# Patient Record
Sex: FEMALE | Race: WHITE | NOT HISPANIC OR LATINO
[De-identification: names, ages, dates, MRNs, and addresses within clinical notes are randomized per-mention and may not be internally consistent; named-entity substitution may affect disease eponyms.]

---

## 2018-10-15 ENCOUNTER — APPOINTMENT (OUTPATIENT)
Dept: SURGERY | Facility: CLINIC | Age: 58
End: 2018-10-15
Payer: COMMERCIAL

## 2018-10-15 ENCOUNTER — APPOINTMENT (OUTPATIENT)
Dept: SURGERY | Facility: CLINIC | Age: 58
End: 2018-10-15

## 2018-10-15 VITALS
TEMPERATURE: 97.9 F | HEART RATE: 89 BPM | WEIGHT: 232 LBS | DIASTOLIC BLOOD PRESSURE: 85 MMHG | BODY MASS INDEX: 38.65 KG/M2 | HEIGHT: 65 IN | SYSTOLIC BLOOD PRESSURE: 132 MMHG | OXYGEN SATURATION: 97 %

## 2018-10-15 PROCEDURE — 99204 OFFICE O/P NEW MOD 45 MIN: CPT

## 2018-10-16 ENCOUNTER — APPOINTMENT (OUTPATIENT)
Dept: BARIATRICS | Facility: CLINIC | Age: 58
End: 2018-10-16

## 2018-10-25 ENCOUNTER — APPOINTMENT (OUTPATIENT)
Dept: ENDOCRINOLOGY | Facility: CLINIC | Age: 58
End: 2018-10-25

## 2018-10-26 ENCOUNTER — APPOINTMENT (OUTPATIENT)
Dept: UROLOGY | Facility: CLINIC | Age: 58
End: 2018-10-26
Payer: COMMERCIAL

## 2018-10-26 VITALS
DIASTOLIC BLOOD PRESSURE: 79 MMHG | SYSTOLIC BLOOD PRESSURE: 148 MMHG | RESPIRATION RATE: 16 BRPM | BODY MASS INDEX: 39.15 KG/M2 | WEIGHT: 235 LBS | HEIGHT: 65 IN | HEART RATE: 92 BPM | TEMPERATURE: 98.7 F

## 2018-10-26 PROCEDURE — 99204 OFFICE O/P NEW MOD 45 MIN: CPT

## 2018-10-29 LAB
APPEARANCE: ABNORMAL
BACTERIA UR CULT: NORMAL
BACTERIA: ABNORMAL
BILIRUBIN URINE: NEGATIVE
BLOOD URINE: ABNORMAL
COLOR: YELLOW
GLUCOSE QUALITATIVE U: 100 MG/DL
HYALINE CASTS: 2 /LPF
KETONES URINE: NEGATIVE
LEUKOCYTE ESTERASE URINE: ABNORMAL
MICROSCOPIC-UA: NORMAL
NITRITE URINE: NEGATIVE
PH URINE: 7
PROTEIN URINE: ABNORMAL MG/DL
RED BLOOD CELLS URINE: 1 /HPF
SPECIFIC GRAVITY URINE: 1.01
SQUAMOUS EPITHELIAL CELLS: 10 /HPF
UROBILINOGEN URINE: NEGATIVE MG/DL
WHITE BLOOD CELLS URINE: 503 /HPF

## 2020-08-03 ENCOUNTER — APPOINTMENT (OUTPATIENT)
Dept: SURGERY | Facility: CLINIC | Age: 60
End: 2020-08-03
Payer: COMMERCIAL

## 2020-08-03 VITALS
WEIGHT: 254 LBS | SYSTOLIC BLOOD PRESSURE: 150 MMHG | BODY MASS INDEX: 42.32 KG/M2 | HEART RATE: 100 BPM | TEMPERATURE: 98 F | HEIGHT: 65 IN | OXYGEN SATURATION: 94 % | DIASTOLIC BLOOD PRESSURE: 90 MMHG

## 2020-08-03 DIAGNOSIS — F12.90 CANNABIS USE, UNSPECIFIED, UNCOMPLICATED: ICD-10-CM

## 2020-08-03 DIAGNOSIS — Z87.09 PERSONAL HISTORY OF OTHER DISEASES OF THE RESPIRATORY SYSTEM: ICD-10-CM

## 2020-08-03 DIAGNOSIS — Z82.49 FAMILY HISTORY OF ISCHEMIC HEART DISEASE AND OTHER DISEASES OF THE CIRCULATORY SYSTEM: ICD-10-CM

## 2020-08-03 DIAGNOSIS — Z86.79 PERSONAL HISTORY OF OTHER DISEASES OF THE CIRCULATORY SYSTEM: ICD-10-CM

## 2020-08-03 DIAGNOSIS — Z87.891 PERSONAL HISTORY OF NICOTINE DEPENDENCE: ICD-10-CM

## 2020-08-03 DIAGNOSIS — Z83.1 FAMILY HISTORY OF OTHER INFECTIOUS AND PARASITIC DISEASES: ICD-10-CM

## 2020-08-03 DIAGNOSIS — Z83.3 FAMILY HISTORY OF DIABETES MELLITUS: ICD-10-CM

## 2020-08-03 DIAGNOSIS — Z81.1 FAMILY HISTORY OF ALCOHOL ABUSE AND DEPENDENCE: ICD-10-CM

## 2020-08-03 DIAGNOSIS — Z86.39 PERSONAL HISTORY OF OTHER ENDOCRINE, NUTRITIONAL AND METABOLIC DISEASE: ICD-10-CM

## 2020-08-03 DIAGNOSIS — K80.10 CALCULUS OF GALLBLADDER WITH CHRONIC CHOLECYSTITIS W/OUT OBSTRUCTION: ICD-10-CM

## 2020-08-03 PROCEDURE — 99213 OFFICE O/P EST LOW 20 MIN: CPT

## 2020-08-03 NOTE — HISTORY OF PRESENT ILLNESS
[de-identified] : Ms. Styles presented previously for evaluation and management of her known cholelithiasis.  She complained of intermittent left upper quadrant abdominal pain.  The pain has been present for approximately 8 months.  She denied radiation of the pain.  She noted a "lump" in the left upper quadrant associated with the pain.  Having a bowel movement and taking Tylenol improve the pain.  She denied nausea, vomiting, fever, or chills.  She denied any food associated with the pain. [de-identified] : She presented today for follow up and to discuss cholecystectomy.  She began having right upper quadrant pain in November 2019 intermittently, mostly at night.  She rated the pain a 4/10.  She denied radiation of the pain and described the pain as stabbing in nature.

## 2020-08-03 NOTE — CONSULT LETTER
[FreeTextEntry1] : August 3, 2020\par \par \par \par Dona Nath M.D.\par [facility]\par [address] [place] [address]\[place] [address]\par Telephone #:  [phone]\par \par \par Re: Tammie Styles\par : 1960\par \par \par Dear Dr. Nath:\par \par I had the opportunity to see Ms. Styles today for follow up and to discuss cholecystectomy.  She began having right upper quadrant pain in 2019 intermittently, mostly at night.  She rated the pain a 4/10.  She denied radiation of the pain and described the pain as stabbing in nature.\par \par On physical examination, her height is 5 feet 5 inches, her weight is 254 pounds, and BMI 42.27.  Her temperature is 98 °F, blood pressure is 150/90, heart rate is 100, and O2 saturation is 94% on room air.  In general, she is a well-dressed, well-nourished woman who appears her stated age and is in no acute distress.  She is calm, alert and oriented x3.  HEENT exam demonstrates no scleral icterus and a normocephalic atraumatic appearance.  Her neck is supple without JVD or cervical lymphadenopathy.  Her lungs are clear to auscultation bilaterally.  Heart sounds S1 S2 are normal with a regular rate and rhythm.  Her abdomen has audible bowel sounds, and is soft, non-tender, and non-distended.  There is no hepatosplenomegaly.  Her extremities are warm and dry without clubbing, cyanosis, or edema. \par \par I reviewed the right upper quadrant ultrasound that was performed on 2018, which demonstrated a small, hyperechoic appearance of the left kidney suggestive of medical renal disease.  Right kidney demonstrates compensatory hypertrophy.  Cholelithiasis without evidence of acute cholecystitis.  Common bile duct measured 0.4 cm.  Hepatic steatosis with hepatomegaly.  Small benign-appearing left renal cyst measuring 0.9 cm.\par \par In summary, Ms. Styles is a 60-year-old woman with cholelithiasis.  We will plan for a robotic-assisted cholecystectomy on 2020.\par \par Thank you for the opportunity to care for this patient. Please do not hesitate to contact me in the event that you have any questions or concerns about the care of this patient.\par \par Sincerely,\par \par \par \par Mar Rodriguez M.D.

## 2020-08-03 NOTE — PHYSICAL EXAM
[Calm] : calm [de-identified] : NAD, comfortable [de-identified] : NCAT, no scleral icterus [de-identified] : Supple, no JVD or cervical lymphadenopathy [de-identified] : CTAB [de-identified] : S1S2 normal, RRR [de-identified] : No clubbing, cyanosis, or edema. [de-identified] : +BS soft, obese, minimal RUQ tenderness.  No hepatosplenomegaly. [de-identified] : Warm, dry. [de-identified] : A&Ox3

## 2020-08-03 NOTE — REVIEW OF SYSTEMS
[Feeling Tired] : feeling tired [Recent Weight Gain (___ Lbs)] : recent [unfilled] ~Ulb weight gain [Eyesight Problems] : eyesight problems [Abdominal Pain] : abdominal pain [Heartburn] : heartburn [Negative] : Heme/Lymph [Fever] : no fever [Chills] : no chills [Recent Weight Loss (___ Lbs)] : no recent weight loss [Feeling Poorly] : not feeling poorly [Red Eyes] : eyes not red [Eye Pain] : no eye pain [Discharge From Eyes] : no purulent discharge from the eyes [Dry Eyes] : no dryness of the eyes [Eyes Itch] : no itching of the eyes [Constipation] : no constipation [Vomiting] : no vomiting [Diarrhea] : no diarrhea [Melena] : no melena

## 2020-08-03 NOTE — ASSESSMENT
[FreeTextEntry1] : Ms. Styles is a 60-year-old woman with cholelithiasis.  We will plan for a robotic-assisted cholecystectomy on August 20, 2020.

## 2020-08-03 NOTE — DATA REVIEWED
[FreeTextEntry1] : US abdomen (9/19/2018) - small, hyperechoic appearance of the left kidney suggestive of medical renal disease.  Right kidney demonstrates compensatory hypertrophy.  Cholelithiasis without evidence of acute cholecystitis.  Common bile duct measured 0.4 cm.  Hepatic steatosis with hepatomegaly.  Small benign-appearing left renal cyst measuring 0.9 cm.

## 2021-03-01 ENCOUNTER — RESULT REVIEW (OUTPATIENT)
Age: 61
End: 2021-03-01

## 2021-03-22 ENCOUNTER — APPOINTMENT (OUTPATIENT)
Dept: SURGERY | Facility: CLINIC | Age: 61
End: 2021-03-22
Payer: COMMERCIAL

## 2021-03-22 VITALS
SYSTOLIC BLOOD PRESSURE: 145 MMHG | HEART RATE: 89 BPM | DIASTOLIC BLOOD PRESSURE: 89 MMHG | BODY MASS INDEX: 41.32 KG/M2 | HEIGHT: 65 IN | WEIGHT: 248 LBS

## 2021-03-22 DIAGNOSIS — Z87.891 PERSONAL HISTORY OF NICOTINE DEPENDENCE: ICD-10-CM

## 2021-03-22 DIAGNOSIS — K62.5 HEMORRHAGE OF ANUS AND RECTUM: ICD-10-CM

## 2021-03-22 DIAGNOSIS — K64.3 FOURTH DEGREE HEMORRHOIDS: ICD-10-CM

## 2021-03-22 DIAGNOSIS — I10 ESSENTIAL (PRIMARY) HYPERTENSION: ICD-10-CM

## 2021-03-22 DIAGNOSIS — Z83.3 FAMILY HISTORY OF DIABETES MELLITUS: ICD-10-CM

## 2021-03-22 DIAGNOSIS — J45.909 UNSPECIFIED ASTHMA, UNCOMPLICATED: ICD-10-CM

## 2021-03-22 DIAGNOSIS — Z00.00 ENCOUNTER FOR GENERAL ADULT MEDICAL EXAMINATION W/OUT ABNORMAL FINDINGS: ICD-10-CM

## 2021-03-22 DIAGNOSIS — Z80.0 FAMILY HISTORY OF MALIGNANT NEOPLASM OF DIGESTIVE ORGANS: ICD-10-CM

## 2021-03-22 DIAGNOSIS — M19.90 UNSPECIFIED OSTEOARTHRITIS, UNSPECIFIED SITE: ICD-10-CM

## 2021-03-22 DIAGNOSIS — Z78.9 OTHER SPECIFIED HEALTH STATUS: ICD-10-CM

## 2021-03-22 PROCEDURE — 99072 ADDL SUPL MATRL&STAF TM PHE: CPT

## 2021-03-22 PROCEDURE — 45300 PROCTOSIGMOIDOSCOPY DX: CPT

## 2021-03-22 PROCEDURE — 99214 OFFICE O/P EST MOD 30 MIN: CPT | Mod: 25

## 2021-03-22 NOTE — REVIEW OF SYSTEMS
[Fever] : no fever [Chills] : no chills [Feeling Poorly] : not feeling poorly [Chest Pain] : no chest pain [Lower Ext Edema] : no lower extremity edema [Shortness Of Breath] : no shortness of breath [Cough] : no cough [Constipation] : no constipation [Diarrhea] : no diarrhea [Pelvic Pain] : no pelvic pain [Skin Lesions] : no skin lesions [Skin Wound] : no skin wound [Confused] : no confusion [Dizziness] : no dizziness [Anxiety] : no anxiety [Muscle Weakness] : no muscle weakness [Swollen Glands] : no swollen glands [FreeTextEntry7] : has pain to right side radiating to right lower back, at times c/w gallstones ; has bleeding with BM's at times

## 2021-03-22 NOTE — CONSULT LETTER
[Dear  ___] : Dear  [unfilled], [Consult Letter:] : I had the pleasure of evaluating your patient, [unfilled]. [Consult Closing:] : Thank you very much for allowing me to participate in the care of this patient.  If you have any questions, please do not hesitate to contact me. [Sincerely,] : Sincerely, [FreeTextEntry3] : Leonardo Duvall MD\par

## 2021-03-22 NOTE — ASSESSMENT
[FreeTextEntry1] : Patient is a 60 y.o F who presents with cc rectal bleeding after BM's, over the past month. On exam, findings as noted below; \par \par Rigid sigmoidoscopy\par Patient was placed in left lateral position. After a digital rectal exam, the sigmoidoscope was inserted gently.\par Scope was passed to 20  cm. \par Findings: grade IV hemorrhoids, prolapsed/external hemorrhoids, no bleeding or masses seen \par \par

## 2021-03-22 NOTE — HISTORY OF PRESENT ILLNESS
[de-identified] : MISHEL PRO is a 60 year old F w PMHX T2DM, who is referred to the office for consultation visit, she presents w the cc of having hemorrhoids. Patient state she's had the hemorrhoids for a long time but recently has been experiences symptoms, including bleeding, over the past month. She notes the bleeding has recently stopped over the past 1-2 weeks. Patient had a colonoscopy a few days ago, c/w rectal polyps, which had been removed. BM's are normal. She denies constipation/straining. Patient states she's been using Anusol rectal cream, with minimal relief. \par Patient also with hx of cholelithiasis, which she's known about for 2 years, no hx of surgery. \par Her most recent Hgb A1c = 13.

## 2021-03-22 NOTE — PHYSICAL EXAM
[Normal Breath Sounds] : Normal breath sounds [Normal Rate and Rhythm] : normal rate and rhythm [No Rash or Lesion] : No rash or lesion [Alert] : alert [Oriented to Person] : oriented to person [Oriented to Place] : oriented to place [Oriented to Time] : oriented to time [Calm] : calm [JVD] : no jugular venous distention  [de-identified] : A/Ox3; NAD. appears comfortable [de-identified] : EOMI; sclera anicteric. Nasal mucosa pink, septum midline. Oral mucosa pink. Tongue midline, Pharynx without exudates. [de-identified] : Abd is soft, nondistended, no rebound or guarding. No abdominal masses. No abdominal tenderness. [de-identified] : normal sphincter tone, no masses felt, no bleeding, external hemorrhoids seen  [de-identified] : +ROM, no joint swelling

## 2021-03-22 NOTE — PLAN
[FreeTextEntry1] : Ms. MISHEL PRO  was informed of significance of findings. All options, risks and benefits were discussed at length. Informed consent for hemorrhoidectomy and the potential post op complications were discussed, including infection, bleeding, leakage, wound problems, recurrence, post op recovery pain, and other related complications. \par The patient wishes to proceed with the planned surgery. We will schedule for surgery at the next available date, pending any required insurance pre-certification or pre-approval. Patient agrees to obtain any necessary pre-operative evaluations and testing prior to surgery.\par she was advised to seek immediate medical attention with any acute change in symptoms or with the development of any new or worsening symptoms. Patient's questions and concerns addressed to patient's satisfaction, and patient verbalized an understanding of the information discussed.\par \par Wants to go ahead with the surgery and will schedule at [facility] [place].\par \par

## 2021-04-25 ENCOUNTER — APPOINTMENT (OUTPATIENT)
Dept: DISASTER EMERGENCY | Facility: CLINIC | Age: 61
End: 2021-04-25

## 2021-04-28 ENCOUNTER — APPOINTMENT (OUTPATIENT)
Dept: SURGERY | Facility: HOSPITAL | Age: 61
End: 2021-04-28

## 2022-01-27 ENCOUNTER — APPOINTMENT (OUTPATIENT)
Dept: SURGERY | Facility: CLINIC | Age: 62
End: 2022-01-27
Payer: COMMERCIAL

## 2022-01-27 VITALS
DIASTOLIC BLOOD PRESSURE: 82 MMHG | HEART RATE: 96 BPM | SYSTOLIC BLOOD PRESSURE: 145 MMHG | BODY MASS INDEX: 43.99 KG/M2 | WEIGHT: 264 LBS | HEIGHT: 65 IN

## 2022-01-27 DIAGNOSIS — K80.20 CALCULUS OF GALLBLADDER W/OUT CHOLECYSTITIS W/OUT OBSTRUCTION: ICD-10-CM

## 2022-01-27 PROCEDURE — 99214 OFFICE O/P EST MOD 30 MIN: CPT

## 2022-01-27 NOTE — CONSULT LETTER
[Dear  ___] : Dear  [unfilled], [Consult Closing:] : Thank you very much for allowing me to participate in the care of this patient.  If you have any questions, please do not hesitate to contact me. [Sincerely,] : Sincerely, [FreeTextEntry3] : Leonardo Duvall MD\par

## 2022-01-27 NOTE — ASSESSMENT
[FreeTextEntry1] : 					\par Exam requested by:\par ARON HAWLEY MD\par [address]par [facility] [address]\par [facility]\par SITE PHONE: [phone]\par Patient: MISHEL PRO\par YOB: 1960\par Phone: [phone]\par MRN: [id number] Acc: [id number]\par Date of Exam: 01-\par  \par EXAM:  MRCP ABDOMEN WITHOUT CONTRAST\par \par HISTORY:  Gallstones.\par \par TECHNIQUE:  MRI of the abdomen performed.\par Contrast Technique:  Without\par Oral Contrast: None.\par Acquisition:  A multiplanar multiecho liver MRI examination was performed utilizing a 3T magnet. \par Subtraction views are created. Diffusion weighted imaging with multiple B values and ADC map imaging is also provided. MRCP imaging is obtained with both thick slab and thin slab acquisition technique. Evaluation of the solid organs is limited without IV contrast administration. \par \par COMPARISON:  Abdominal ultrasound from 6/17/2019.\par \par FINDINGS: VISUALIZED PORTION OF CHEST\par LUNGS: No abnormal signal.\par HEART: No abnormal signal.\par \par FINDINGS: ABDOMEN\par \par LIVER: There is diffuse decreased hepatic signal on out of phase sequences consistent with fatty infiltration.\par \par BILIARY: Cholelithiasis is present.\par \par CBD diameters up to 0.4 cm.\par \par PANCREAS: There is pancreatic fatty atrophy.\par \par SPLEEN: Just medial to the anterior margin of the lesion is an ovoid structure which follows signal intensity, measuring approximately 1.5 x 1.2 cm, consistent with a splenule.\par \par ADRENAL GLANDS: Unremarkable.\par \par KIDNEYS: The right kidney demonstrates a subcentimeter nonenhancing the medial mid to lower pole low lesion compatible with a cyst.\par \par There is significant left renal atrophy.\par \par PROXIMAL URETERS: Unremarkable.\par \par STOMACH: Unremarkable.\par \par VISUALIZED PORTION OF BOWEL: Unremarkable.\par \par PERITONEUM: Unremarkable.\par \par LYMPH NODES: Unremarkable.\par \par VASCULATURE: Unremarkable.\par \par SOFT TISSUES: Unremarkable.\par \par BONES: There is a grade 1 L5/S1 anterolisthesis with possible bilateral spondylolysis.   \par \par IMPRESSION:  \par Hepatic fatty infiltration.\par \par Cholelithiasis.\par \par Pancreatic fatty atrophy.\par \par Left renal atrophy.\par \par L5/S1 grade 1 anterolisthesis with probable spondylolysis. A more detailed evaluation may be obtained utilizing dedicated lumbar spine MRI if needed.\par \par Thank you for the opportunity to participate in the care of this patient.  \par  \par FRANK P LOMBARDO MD  - Electronically Signed: 01- 10:54 AM \par Physician to Physician Direct Line is: [phone]\par Confidential\par Tel: [phone] - Fax: [phone] - [email or web address]\par Printed: 01- 2:26 PM\par MISHEL PRO (Exam: 01- 1:00 PM)\par Page 1 of 1\par

## 2022-01-27 NOTE — HISTORY OF PRESENT ILLNESS
[de-identified] : MISHEL PRO is a 60 year old F w PMHX T2DM, who is referred to the office for consultation visit,for gallstones\par She gets RUQ abdominal pain and nauseous feeling on and off\par Had US abdomen and MRCP which showed normal CBD and gallstones.

## 2022-01-27 NOTE — PLAN
[FreeTextEntry1] : Patient with symptomatic gallstones and abdominal pain. Had a long d/w the pt. All the options, benefits and risks of laparoscopic cholecystectomy was discussed. The potential to go open, the small potential for bleeding, CBD injury, bile leak and other related complications were discussed. All the questions were answered to her satisfaction. Will schedule at [facility] [place]\par

## 2022-02-07 ENCOUNTER — OUTPATIENT (OUTPATIENT)
Dept: OUTPATIENT SERVICES | Facility: HOSPITAL | Age: 62
LOS: 1 days | End: 2022-02-07
Payer: COMMERCIAL

## 2022-02-07 VITALS
HEIGHT: 65 IN | WEIGHT: 268.96 LBS | RESPIRATION RATE: 18 BRPM | SYSTOLIC BLOOD PRESSURE: 146 MMHG | OXYGEN SATURATION: 98 % | TEMPERATURE: 98 F | DIASTOLIC BLOOD PRESSURE: 85 MMHG | HEART RATE: 82 BPM

## 2022-02-07 DIAGNOSIS — E11.9 TYPE 2 DIABETES MELLITUS WITHOUT COMPLICATIONS: ICD-10-CM

## 2022-02-07 DIAGNOSIS — K80.10 CALCULUS OF GALLBLADDER WITH CHRONIC CHOLECYSTITIS WITHOUT OBSTRUCTION: ICD-10-CM

## 2022-02-07 DIAGNOSIS — I10 ESSENTIAL (PRIMARY) HYPERTENSION: ICD-10-CM

## 2022-02-07 DIAGNOSIS — Z98.890 OTHER SPECIFIED POSTPROCEDURAL STATES: Chronic | ICD-10-CM

## 2022-02-07 DIAGNOSIS — Z01.818 ENCOUNTER FOR OTHER PREPROCEDURAL EXAMINATION: ICD-10-CM

## 2022-02-07 DIAGNOSIS — Z91.89 OTHER SPECIFIED PERSONAL RISK FACTORS, NOT ELSEWHERE CLASSIFIED: ICD-10-CM

## 2022-02-07 LAB
ALBUMIN SERPL ELPH-MCNC: 3.1 G/DL — LOW (ref 3.5–5)
ALP SERPL-CCNC: 127 U/L — HIGH (ref 40–120)
ALT FLD-CCNC: 29 U/L DA — SIGNIFICANT CHANGE UP (ref 10–60)
ANION GAP SERPL CALC-SCNC: 5 MMOL/L — SIGNIFICANT CHANGE UP (ref 5–17)
APTT BLD: 31.7 SEC — SIGNIFICANT CHANGE UP (ref 27.5–35.5)
AST SERPL-CCNC: 12 U/L — SIGNIFICANT CHANGE UP (ref 10–40)
BASOPHILS # BLD AUTO: 0.04 K/UL — SIGNIFICANT CHANGE UP (ref 0–0.2)
BASOPHILS NFR BLD AUTO: 0.4 % — SIGNIFICANT CHANGE UP (ref 0–2)
BILIRUB SERPL-MCNC: 0.2 MG/DL — SIGNIFICANT CHANGE UP (ref 0.2–1.2)
BLD GP AB SCN SERPL QL: SIGNIFICANT CHANGE UP
BUN SERPL-MCNC: 21 MG/DL — HIGH (ref 7–18)
CALCIUM SERPL-MCNC: 9.4 MG/DL — SIGNIFICANT CHANGE UP (ref 8.4–10.5)
CHLORIDE SERPL-SCNC: 104 MMOL/L — SIGNIFICANT CHANGE UP (ref 96–108)
CO2 SERPL-SCNC: 28 MMOL/L — SIGNIFICANT CHANGE UP (ref 22–31)
CREAT SERPL-MCNC: 1 MG/DL — SIGNIFICANT CHANGE UP (ref 0.5–1.3)
EOSINOPHIL # BLD AUTO: 0.17 K/UL — SIGNIFICANT CHANGE UP (ref 0–0.5)
EOSINOPHIL NFR BLD AUTO: 1.9 % — SIGNIFICANT CHANGE UP (ref 0–6)
GLUCOSE SERPL-MCNC: 296 MG/DL — HIGH (ref 70–99)
HCT VFR BLD CALC: 37.2 % — SIGNIFICANT CHANGE UP (ref 34.5–45)
HGB BLD-MCNC: 11.9 G/DL — SIGNIFICANT CHANGE UP (ref 11.5–15.5)
IMM GRANULOCYTES NFR BLD AUTO: 0.6 % — SIGNIFICANT CHANGE UP (ref 0–1.5)
INR BLD: 1 RATIO — SIGNIFICANT CHANGE UP (ref 0.88–1.16)
LYMPHOCYTES # BLD AUTO: 1.51 K/UL — SIGNIFICANT CHANGE UP (ref 1–3.3)
LYMPHOCYTES # BLD AUTO: 16.6 % — SIGNIFICANT CHANGE UP (ref 13–44)
MCHC RBC-ENTMCNC: 26.2 PG — LOW (ref 27–34)
MCHC RBC-ENTMCNC: 32 GM/DL — SIGNIFICANT CHANGE UP (ref 32–36)
MCV RBC AUTO: 81.8 FL — SIGNIFICANT CHANGE UP (ref 80–100)
MONOCYTES # BLD AUTO: 0.86 K/UL — SIGNIFICANT CHANGE UP (ref 0–0.9)
MONOCYTES NFR BLD AUTO: 9.5 % — SIGNIFICANT CHANGE UP (ref 2–14)
NEUTROPHILS # BLD AUTO: 6.45 K/UL — SIGNIFICANT CHANGE UP (ref 1.8–7.4)
NEUTROPHILS NFR BLD AUTO: 71 % — SIGNIFICANT CHANGE UP (ref 43–77)
NRBC # BLD: 0 /100 WBCS — SIGNIFICANT CHANGE UP (ref 0–0)
PLATELET # BLD AUTO: 324 K/UL — SIGNIFICANT CHANGE UP (ref 150–400)
POTASSIUM SERPL-MCNC: 4.3 MMOL/L — SIGNIFICANT CHANGE UP (ref 3.5–5.3)
POTASSIUM SERPL-SCNC: 4.3 MMOL/L — SIGNIFICANT CHANGE UP (ref 3.5–5.3)
PROT SERPL-MCNC: 7.8 G/DL — SIGNIFICANT CHANGE UP (ref 6–8.3)
PROTHROM AB SERPL-ACNC: 11.9 SEC — SIGNIFICANT CHANGE UP (ref 10.6–13.6)
RBC # BLD: 4.55 M/UL — SIGNIFICANT CHANGE UP (ref 3.8–5.2)
RBC # FLD: 15.9 % — HIGH (ref 10.3–14.5)
SODIUM SERPL-SCNC: 137 MMOL/L — SIGNIFICANT CHANGE UP (ref 135–145)
WBC # BLD: 9.08 K/UL — SIGNIFICANT CHANGE UP (ref 3.8–10.5)
WBC # FLD AUTO: 9.08 K/UL — SIGNIFICANT CHANGE UP (ref 3.8–10.5)

## 2022-02-07 PROCEDURE — G0463: CPT

## 2022-02-07 NOTE — H&P PST ADULT - NSICDXPASTMEDICALHX_GEN_ALL_CORE_FT
PAST MEDICAL HISTORY:  Acid reflux     Asthma     Hypertension     Obesity     Osteoarthritis     Seasonal allergies     Type 2 diabetes mellitus

## 2022-02-07 NOTE — H&P PST ADULT - PROBLEM SELECTOR PLAN 3
Current treatment regimen - Telmisartan/Hctz 80/12.5 and Amlodipine 10mg daily. Advised to c/w regimen   Patient instructed with understanding verbalized to take Telmisartan/Hctz and Amlodipine with a sip of water the day of surgery.  Follow up with PCP postoperatively.

## 2022-02-07 NOTE — H&P PST ADULT - PROBLEM SELECTOR PLAN 1
Patient scheduled for Laparoscopic cholecystectomy on 2/16/2022  Written and oral preoperative instructions given to patient with understanding verbalized.   Instructions given to include using 4% chlorhexidine wash as directed day of surgery.   Maintaining NPO status post midnight day before surgery  Stopping aspirin, NSAIDs, herbs, vitamins 7days before surgery   Patient is to expect a phone call day before surgery between the hours of 430- 630pm giving arrival time for surgery day.    Preoperative labs drawn today, results pending  Patient reports having CXR done on 2/7/2022 and is scheduled for preoperative medial optimization on 2/8/2022

## 2022-02-07 NOTE — H&P PST ADULT - NSICDXFAMILYHX_GEN_ALL_CORE_FT
FAMILY HISTORY:  Mother  Still living? Unknown  Family history of diabetes mellitus, Age at diagnosis: Age Unknown  Family history of hypertension, Age at diagnosis: Age Unknown  Family history of throat cancer, Age at diagnosis: Age Unknown

## 2022-02-07 NOTE — H&P PST ADULT - NSANTHOSAYNRD_GEN_A_CORE
No. SCOTTIE screening performed.  STOP BANG Legend: 0-2 = LOW Risk; 3-4 = INTERMEDIATE Risk; 5-8 = HIGH Risk

## 2022-02-07 NOTE — H&P PST ADULT - PROBLEM SELECTOR PLAN 2
Patient today with STOP bang score of 3, Intermediate risk for SCOTTIE   Patient denies current hx/dx of SCOTTIE or sleep study test   Recommend maintaining perioperative SCOTTIE risk precautions

## 2022-02-07 NOTE — H&P PST ADULT - PROBLEM SELECTOR PLAN 4
Current treatment regimen for diabetes - Metformin 1000mg bid. Advised to c/w regimen  Patient instructed with understanding verbalized to HOLD Metformin and Fiasp Insulin the day of surgery. Take regularly scheduled Tresiba night before surgery   Last Hgb A1C not known, drawn today, results pending   Fingerstick STAT AM day of surgery ordered  Follow up with PCP postoperatively.

## 2022-02-08 ENCOUNTER — TRANSCRIPTION ENCOUNTER (OUTPATIENT)
Age: 62
End: 2022-02-08

## 2022-02-08 LAB
A1C WITH ESTIMATED AVERAGE GLUCOSE RESULT: 10.1 % — HIGH (ref 4–5.6)
ESTIMATED AVERAGE GLUCOSE: 243 MG/DL — HIGH (ref 68–114)

## 2022-02-13 LAB — SARS-COV-2 N GENE NPH QL NAA+PROBE: NOT DETECTED

## 2022-02-15 ENCOUNTER — TRANSCRIPTION ENCOUNTER (OUTPATIENT)
Age: 62
End: 2022-02-15

## 2022-02-16 ENCOUNTER — APPOINTMENT (OUTPATIENT)
Dept: SURGERY | Facility: HOSPITAL | Age: 62
End: 2022-02-16

## 2022-02-16 ENCOUNTER — RESULT REVIEW (OUTPATIENT)
Age: 62
End: 2022-02-16

## 2022-02-16 ENCOUNTER — OUTPATIENT (OUTPATIENT)
Dept: OUTPATIENT SERVICES | Facility: HOSPITAL | Age: 62
LOS: 1 days | End: 2022-02-16
Payer: COMMERCIAL

## 2022-02-16 VITALS
DIASTOLIC BLOOD PRESSURE: 68 MMHG | SYSTOLIC BLOOD PRESSURE: 125 MMHG | RESPIRATION RATE: 16 BRPM | HEART RATE: 98 BPM | HEIGHT: 65 IN | TEMPERATURE: 99 F | WEIGHT: 268.96 LBS | OXYGEN SATURATION: 97 %

## 2022-02-16 VITALS
SYSTOLIC BLOOD PRESSURE: 105 MMHG | HEART RATE: 77 BPM | DIASTOLIC BLOOD PRESSURE: 53 MMHG | RESPIRATION RATE: 18 BRPM | TEMPERATURE: 98 F | OXYGEN SATURATION: 95 %

## 2022-02-16 DIAGNOSIS — Z01.818 ENCOUNTER FOR OTHER PREPROCEDURAL EXAMINATION: ICD-10-CM

## 2022-02-16 DIAGNOSIS — Z98.890 OTHER SPECIFIED POSTPROCEDURAL STATES: Chronic | ICD-10-CM

## 2022-02-16 DIAGNOSIS — K80.10 CALCULUS OF GALLBLADDER WITH CHRONIC CHOLECYSTITIS WITHOUT OBSTRUCTION: ICD-10-CM

## 2022-02-16 LAB
BLD GP AB SCN SERPL QL: SIGNIFICANT CHANGE UP
GLUCOSE BLDC GLUCOMTR-MCNC: 268 MG/DL — HIGH (ref 70–99)
GLUCOSE BLDC GLUCOMTR-MCNC: 275 MG/DL — HIGH (ref 70–99)
GLUCOSE BLDC GLUCOMTR-MCNC: 281 MG/DL — HIGH (ref 70–99)
GLUCOSE BLDC GLUCOMTR-MCNC: 285 MG/DL — HIGH (ref 70–99)

## 2022-02-16 PROCEDURE — 36415 COLL VENOUS BLD VENIPUNCTURE: CPT

## 2022-02-16 PROCEDURE — 47562 LAPAROSCOPIC CHOLECYSTECTOMY: CPT

## 2022-02-16 PROCEDURE — 86900 BLOOD TYPING SEROLOGIC ABO: CPT

## 2022-02-16 PROCEDURE — 47562 LAPAROSCOPIC CHOLECYSTECTOMY: CPT | Mod: AS

## 2022-02-16 PROCEDURE — 88304 TISSUE EXAM BY PATHOLOGIST: CPT

## 2022-02-16 PROCEDURE — C9399: CPT

## 2022-02-16 PROCEDURE — 86901 BLOOD TYPING SEROLOGIC RH(D): CPT

## 2022-02-16 PROCEDURE — 88304 TISSUE EXAM BY PATHOLOGIST: CPT | Mod: 26

## 2022-02-16 PROCEDURE — 82962 GLUCOSE BLOOD TEST: CPT

## 2022-02-16 PROCEDURE — 86850 RBC ANTIBODY SCREEN: CPT

## 2022-02-16 DEVICE — CLIP APPLIER COVIDIEN ENDOCLIP 10MM LARGE: Type: IMPLANTABLE DEVICE | Status: FUNCTIONAL

## 2022-02-16 RX ORDER — FAMOTIDINE 10 MG/ML
1 INJECTION INTRAVENOUS
Qty: 0 | Refills: 0 | DISCHARGE

## 2022-02-16 RX ORDER — INSULIN DEGLUDEC 100 U/ML
50 INJECTION, SOLUTION SUBCUTANEOUS
Qty: 0 | Refills: 0 | DISCHARGE

## 2022-02-16 RX ORDER — INSULIN HUMAN 100 [IU]/ML
10 INJECTION, SOLUTION SUBCUTANEOUS ONCE
Refills: 0 | Status: COMPLETED | OUTPATIENT
Start: 2022-02-16 | End: 2022-02-16

## 2022-02-16 RX ORDER — FENTANYL CITRATE 50 UG/ML
50 INJECTION INTRAVENOUS
Refills: 0 | Status: DISCONTINUED | OUTPATIENT
Start: 2022-02-16 | End: 2022-02-16

## 2022-02-16 RX ORDER — FENTANYL CITRATE 50 UG/ML
25 INJECTION INTRAVENOUS
Refills: 0 | Status: DISCONTINUED | OUTPATIENT
Start: 2022-02-16 | End: 2022-02-16

## 2022-02-16 RX ORDER — AMLODIPINE BESYLATE 2.5 MG/1
1 TABLET ORAL
Qty: 0 | Refills: 0 | DISCHARGE

## 2022-02-16 RX ORDER — SODIUM CHLORIDE 9 MG/ML
3 INJECTION INTRAMUSCULAR; INTRAVENOUS; SUBCUTANEOUS EVERY 8 HOURS
Refills: 0 | Status: DISCONTINUED | OUTPATIENT
Start: 2022-02-16 | End: 2022-02-16

## 2022-02-16 RX ORDER — INSULIN ASPART 100 [IU]/ML
20 INJECTION, SOLUTION SUBCUTANEOUS
Qty: 0 | Refills: 0 | DISCHARGE

## 2022-02-16 RX ORDER — METFORMIN HYDROCHLORIDE 850 MG/1
1 TABLET ORAL
Qty: 0 | Refills: 0 | DISCHARGE

## 2022-02-16 RX ORDER — TELMISARTAN AND HYDROCHLOROTHIAZIDE 40; 12.5 MG/1; MG/1
0 TABLET ORAL
Qty: 0 | Refills: 0 | DISCHARGE

## 2022-02-16 RX ORDER — GABAPENTIN 400 MG/1
1 CAPSULE ORAL
Qty: 6 | Refills: 0
Start: 2022-02-16 | End: 2022-02-17

## 2022-02-16 RX ORDER — KETOROLAC TROMETHAMINE 30 MG/ML
15 SYRINGE (ML) INJECTION ONCE
Refills: 0 | Status: DISCONTINUED | OUTPATIENT
Start: 2022-02-16 | End: 2022-02-16

## 2022-02-16 RX ORDER — METOPROLOL TARTRATE 50 MG
1 TABLET ORAL
Qty: 0 | Refills: 0 | DISCHARGE

## 2022-02-16 RX ADMIN — FENTANYL CITRATE 50 MICROGRAM(S): 50 INJECTION INTRAVENOUS at 09:59

## 2022-02-16 RX ADMIN — FENTANYL CITRATE 25 MICROGRAM(S): 50 INJECTION INTRAVENOUS at 11:58

## 2022-02-16 RX ADMIN — FENTANYL CITRATE 25 MICROGRAM(S): 50 INJECTION INTRAVENOUS at 11:02

## 2022-02-16 RX ADMIN — INSULIN HUMAN 10 UNIT(S): 100 INJECTION, SOLUTION SUBCUTANEOUS at 10:58

## 2022-02-16 RX ADMIN — FENTANYL CITRATE 50 MICROGRAM(S): 50 INJECTION INTRAVENOUS at 10:25

## 2022-02-16 NOTE — ASU DISCHARGE PLAN (ADULT/PEDIATRIC) - NS MD DC FALL RISK RISK
For information on Fall & Injury Prevention, visit: [email or web address] OR  [email or web address] OR  https://www.cdc.gov/steadi/patient.html

## 2022-02-16 NOTE — BRIEF OPERATIVE NOTE - NSICDXBRIEFPROCEDURE_GEN_ALL_CORE_FT
PROCEDURES:  Cholecystectomy, laparoscopic, with possible cholangiogram 16-Feb-2022 09:16:09  Chuckie Josue

## 2022-02-16 NOTE — ASU DISCHARGE PLAN (ADULT/PEDIATRIC) - CARE PROVIDER_API CALL
Leonardo Duvall (MD)  Surgery  [address] [place], [place] [id number]  Phone: [phone]  Fax: [phone]  Follow Up Time:

## 2022-02-16 NOTE — ASU PATIENT PROFILE, ADULT - FALL HARM RISK - UNIVERSAL INTERVENTIONS
Bed in lowest position, wheels locked, appropriate side rails in place/Call bell, personal items and telephone in reach/Instruct patient to call for assistance before getting out of bed or chair/Non-slip footwear when patient is out of bed/[place] to call system/Physically safe environment - no spills, clutter or unnecessary equipment/Purposeful Proactive Rounding/Room/bathroom lighting operational, light cord in reach

## 2022-02-23 LAB — SURGICAL PATHOLOGY STUDY: SIGNIFICANT CHANGE UP

## 2022-02-24 PROBLEM — E11.9 TYPE 2 DIABETES MELLITUS WITHOUT COMPLICATIONS: Chronic | Status: ACTIVE | Noted: 2022-02-07

## 2022-02-24 PROBLEM — I10 ESSENTIAL (PRIMARY) HYPERTENSION: Chronic | Status: ACTIVE | Noted: 2022-02-07

## 2022-02-24 PROBLEM — J30.2 OTHER SEASONAL ALLERGIC RHINITIS: Chronic | Status: ACTIVE | Noted: 2022-02-07

## 2022-02-24 PROBLEM — E66.9 OBESITY, UNSPECIFIED: Chronic | Status: ACTIVE | Noted: 2022-02-07

## 2022-02-24 PROBLEM — K21.9 GASTRO-ESOPHAGEAL REFLUX DISEASE WITHOUT ESOPHAGITIS: Chronic | Status: ACTIVE | Noted: 2022-02-07

## 2022-02-24 PROBLEM — J45.909 UNSPECIFIED ASTHMA, UNCOMPLICATED: Chronic | Status: ACTIVE | Noted: 2022-02-07

## 2022-02-24 PROBLEM — M19.90 UNSPECIFIED OSTEOARTHRITIS, UNSPECIFIED SITE: Chronic | Status: ACTIVE | Noted: 2022-02-07

## 2022-02-28 ENCOUNTER — APPOINTMENT (OUTPATIENT)
Dept: SURGERY | Facility: CLINIC | Age: 62
End: 2022-02-28
Payer: COMMERCIAL

## 2022-02-28 VITALS — TEMPERATURE: 97 F

## 2022-02-28 PROCEDURE — 99024 POSTOP FOLLOW-UP VISIT: CPT

## 2022-02-28 NOTE — HISTORY OF PRESENT ILLNESS
[de-identified] : MISHEL PRO presents to the office for postoperative visit today, she is s/p laparoscopic cholecystectomy 02/16/22.

## 2022-11-15 NOTE — ASU PATIENT PROFILE, ADULT - BLOOD AVOIDANCE/RESTRICTIONS, PROFILE
Final Anesthesia Post-op Assessment    Patient: Bonifacio Duarte  Procedure(s) Performed: EGD (ESOPHAGOGASTRODUODENOSCOPY)  Anesthesia type: MAC    Vitals Value Taken Time   Temp 36.3 °C (97.3 °F) 11/15/22 0812   Pulse 65 11/15/22 0812   Resp 15 11/15/22 0812   SpO2 98 % 11/15/22 0812   BP 84/52 11/15/22 0812         Patient Location: Phase II  Post-op Vital Signs:stable  Level of Consciousness: participates in exam, awake and alert  Respiratory Status: spontaneous ventilation and unassisted  Cardiovascular blood pressure returned to baseline  Hydration: euvolemic  Pain Management: well controlled and adequately managed  Handoff: Handoff to receiving clinician was performed and questions were answered  Vomiting: none  Nausea: None  Airway Patency:patent  Post-op Assessment: awake, alert, appropriately conversant, or baseline, no complications, patient tolerated procedure well with no complications, no evidence of recall, regional anesthetic in place - able to participate, dentition within defined limits, moving all extremities and No Corneal Abrasion      There were no known complications for this encounter.    none

## 2022-11-30 ENCOUNTER — APPOINTMENT (OUTPATIENT)
Dept: HEPATOLOGY | Facility: CLINIC | Age: 62
End: 2022-11-30

## 2022-11-30 VITALS
BODY MASS INDEX: 42.65 KG/M2 | SYSTOLIC BLOOD PRESSURE: 146 MMHG | RESPIRATION RATE: 16 BRPM | HEIGHT: 65 IN | DIASTOLIC BLOOD PRESSURE: 82 MMHG | OXYGEN SATURATION: 98 % | HEART RATE: 104 BPM | WEIGHT: 256 LBS | TEMPERATURE: 97.3 F

## 2022-11-30 DIAGNOSIS — B36.9 SUPERFICIAL MYCOSIS, UNSPECIFIED: ICD-10-CM

## 2022-11-30 DIAGNOSIS — K21.9 GASTRO-ESOPHAGEAL REFLUX DISEASE W/OUT ESOPHAGITIS: ICD-10-CM

## 2022-11-30 DIAGNOSIS — E66.01 MORBID (SEVERE) OBESITY DUE TO EXCESS CALORIES: ICD-10-CM

## 2022-11-30 DIAGNOSIS — K76.0 FATTY (CHANGE OF) LIVER, NOT ELSEWHERE CLASSIFIED: ICD-10-CM

## 2022-11-30 DIAGNOSIS — E11.9 TYPE 2 DIABETES MELLITUS W/OUT COMPLICATIONS: ICD-10-CM

## 2022-11-30 DIAGNOSIS — E55.9 VITAMIN D DEFICIENCY, UNSPECIFIED: ICD-10-CM

## 2022-11-30 PROCEDURE — 99204 OFFICE O/P NEW MOD 45 MIN: CPT

## 2022-11-30 RX ORDER — METFORMIN HYDROCHLORIDE 1000 MG/1
1000 TABLET, COATED ORAL
Refills: 0 | Status: DISCONTINUED | COMMUNITY
End: 2022-11-30

## 2022-11-30 RX ORDER — LOSARTAN POTASSIUM AND HYDROCHLOROTHIAZIDE 12.5; 1 MG/1; MG/1
100-12.5 TABLET ORAL DAILY
Refills: 0 | Status: ACTIVE | COMMUNITY
Start: 2022-06-08

## 2022-11-30 RX ORDER — FAMOTIDINE 40 MG/1
40 TABLET, FILM COATED ORAL
Refills: 0 | Status: ACTIVE | COMMUNITY

## 2022-11-30 RX ORDER — GLIPIZIDE 5 MG/1
5 TABLET ORAL DAILY
Refills: 0 | Status: ACTIVE | COMMUNITY

## 2022-11-30 RX ORDER — TELMISARTAN 20 MG/1
TABLET ORAL
Refills: 0 | Status: DISCONTINUED | COMMUNITY
End: 2022-11-30

## 2022-11-30 RX ORDER — PIOGLITAZONE HYDROCHLORIDE 45 MG/1
45 TABLET ORAL
Refills: 0 | Status: DISCONTINUED | COMMUNITY
End: 2022-11-30

## 2022-11-30 RX ORDER — COVID-19 MOLECULAR TEST ASSAY
KIT MISCELLANEOUS
Refills: 0 | Status: ACTIVE | COMMUNITY
Start: 2022-09-12

## 2022-11-30 RX ORDER — AMLODIPINE BESYLATE 10 MG/1
10 TABLET ORAL
Qty: 90 | Refills: 0 | Status: ACTIVE | COMMUNITY
Start: 2022-02-08

## 2022-11-30 RX ORDER — GLIMEPIRIDE 4 MG/1
4 TABLET ORAL
Refills: 0 | Status: DISCONTINUED | COMMUNITY
End: 2022-11-30

## 2022-11-30 RX ORDER — LANCETS 28 GAUGE
EACH MISCELLANEOUS
Refills: 0 | Status: ACTIVE | COMMUNITY
Start: 2022-10-19

## 2022-11-30 RX ORDER — METOPROLOL SUCCINATE 100 MG/1
100 TABLET, EXTENDED RELEASE ORAL DAILY
Refills: 0 | Status: ACTIVE | COMMUNITY
Start: 2022-02-08

## 2022-11-30 RX ORDER — PEN NEEDLE, DIABETIC 32GX 5/32"
32G X 4 MM NEEDLE, DISPOSABLE MISCELLANEOUS
Refills: 0 | Status: ACTIVE | COMMUNITY
Start: 2022-06-06

## 2022-11-30 RX ORDER — AMLODIPINE AND VALSARTAN 5; 320 MG/1; MG/1
5-320 TABLET, FILM COATED ORAL
Refills: 0 | Status: DISCONTINUED | COMMUNITY
End: 2022-11-30

## 2022-11-30 RX ORDER — CHROMIUM 200 MCG
TABLET ORAL
Refills: 0 | Status: DISCONTINUED | COMMUNITY
End: 2022-11-30

## 2022-11-30 RX ORDER — GEMFIBROZIL 600 MG/1
600 TABLET, FILM COATED ORAL
Refills: 0 | Status: DISCONTINUED | COMMUNITY
End: 2022-11-30

## 2022-11-30 RX ORDER — NYSTATIN 100000 U/G
100000 OINTMENT TOPICAL
Refills: 0 | Status: ACTIVE | COMMUNITY
Start: 2022-09-09

## 2022-11-30 RX ORDER — ASPIRIN 81 MG
81 TABLET, DELAYED RELEASE (ENTERIC COATED) ORAL
Refills: 0 | Status: DISCONTINUED | COMMUNITY
End: 2022-11-30

## 2022-11-30 RX ORDER — INSULIN DEGLUDEC INJECTION 100 U/ML
INJECTION, SOLUTION SUBCUTANEOUS
Refills: 0 | Status: DISCONTINUED | COMMUNITY
End: 2022-11-30

## 2022-11-30 RX ORDER — CALCIUM CARBONATE/VITAMIN D3 500MG-5MCG
500-5 TABLET ORAL
Refills: 0 | Status: ACTIVE | COMMUNITY

## 2022-11-30 RX ORDER — EMPAGLIFLOZIN AND METFORMIN HYDROCHLORIDE 12.5; 1 MG/1; MG/1
12.5-1 TABLET ORAL TWICE DAILY
Refills: 0 | Status: ACTIVE | COMMUNITY
Start: 2022-09-12

## 2022-11-30 RX ORDER — ALCOHOL ANTISEPTIC PADS
70 PADS, MEDICATED (EA) TOPICAL
Refills: 0 | Status: ACTIVE | COMMUNITY
Start: 2022-11-09

## 2022-11-30 NOTE — HISTORY OF PRESENT ILLNESS
[Needlestick Exposure] : no needlestick exposure [Infected Sexual Partner] : no infected sexual partner [IV Drug Use] : no IV drug use [Tattoo] : no tattoos [Body Piercing] : no body piercing [Hemodialysis] : no hemodialysis [Transfusion before 1992] : no transfusion before 1992 [Transplant before 1992] : no transplant before 1992 [Incarceration] : no incarceration [Alcohol Abuse] : no alcohol abuse [Autoimmune Disorder] : no autoimmune disorder [Household Contact to HBV] : no household contact to HBV [Cocaine Use] : no cocaine use [de-identified] : Only social alcohol (1x in every 3-4 months). 10/2022 [place]. Born in [place]. 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  [FreeTextEntry1] : 63 yo Female with Hx of uncontrolled DM (HbA1c 10% 7/2022), HTN, asthma, s/p laparoscopic cholecystectomy (2/16/22) b/o gallstones with chronic cholecystitis, cataract (R), R ankle fracture (metal plate), is here for initial evaluation for fatty liver, referred by her PCP. \par She was told to have fatty liver since her early 50s. Denies toxic habits, drinks alcohol only socially, once in every 3-4 months. Her father had alcohol use disorder, but no known liver disease. Denies OTC meds, herbal supplements. She has sedentary life style, mostly because limited due to her `s illness. \par She overall feels well, but does c/o heartburn, occasional small hematochezia that she relates to her hemorrhoids,  loose bowel movements since the cholecystectomy, and flatulence. She repots that her weight was 9lb more a week ago, but could be due to the use of different scale. \par She had EGD and colonoscopy in 2021, reports that had colon polyps. No records.\par

## 2022-11-30 NOTE — ASSESSMENT
[FreeTextEntry1] : 63 yo obese (BMI  42) Female with Hx of uncontrolled DM (HbA1c 10% 7/2022), HTN, asthma, s/p laparoscopic cholecystectomy (2/16/22) b/o gallstones with chronic cholecystitis, cataract (R), R ankle fracture (metal plate), is here for initial evaluation for fatty liver, referred by her PCP. \par She was told to have fatty liver since her early 50s, has sedentary life style, mostly because limited due to her `s illness.  Denies toxic habits. She does have lot of GI symptoms as above.\par \par # Morbid obesity\par # Fatty liver\par - Discussed natural Hx of NAFLD, complications. Discussed that currently no FDA approved medications. Discussed  life style modifications as below.\par - Patient has been counseled on life style interventions, including but not limited to: weight loss (3-5% loss of body weight might improve fat in the liver, while 7-10% needed for potential improvement of other components, including inflammation and scarring of the liver, called fibrosis); healthy diet, avoiding added sugars, sodas, avoiding saturated fats, limiting sodium, avoiding alcohol; and on the importance of regular exercise (> 150 min/week moderate intensity aerobic exercise with at least 2x/week muscle strengthening or exercise as tolerated). \par - Discussed complications of morbid obesity.\par - Discussed that would need to rule out additional etiologies of liver disease. Ordered CLD blood work.\par - No signs of advanced liver disease. \par - Ordered Fibroscan to assess steatosis and fibrosis. \par - Ordered US abd,\par - Offered nutritionist, but patient deferred for now\par - Discussed the need of better DM control. Advised to follow up with her endocrinologist.\par - Discussed semaglutide, but patient reports that tried Ozempic and also Trulicity, but both were discontinued because of side effects - very frequent yeast infections.  \par \par # Heartburn\par - Discussed anti reflux diet\par \par # Dyspepsia\par # Hematochezia\par # Flatulence\par - Advised to follow up with GI\par \par # Diarrhea - postcholecystectomy diarrhea??\par - Mgmt. per GI\par \par RTC 3-4 weeks with above results\par

## 2022-11-30 NOTE — PHYSICAL EXAM
[Non-Tender] : non-tender [General Appearance - Alert] : alert [General Appearance - In No Acute Distress] : in no acute distress [General Appearance - Well Nourished] : well nourished [General Appearance - Well Developed] : well developed [Sclera] : the sclera and conjunctiva were normal [Oropharynx] : the oropharynx was normal [Neck Appearance] : the appearance of the neck was normal [Jugular Venous Distention Increased] : there was no jugular-venous distention [] : no respiratory distress [Respiration, Rhythm And Depth] : normal respiratory rhythm and effort [Exaggerated Use Of Accessory Muscles For Inspiration] : no accessory muscle use [Auscultation Breath Sounds / Voice Sounds] : lungs were clear to auscultation bilaterally [Heart Rate And Rhythm] : heart rate was normal and rhythm regular [Heart Sounds] : normal S1 and S2 [Edema] : there was no peripheral edema [Obese] : obese [Normal] : normal [Scar] : a scar was noted [Soft, Nontender] : the abdomen was soft and nontender [None] : no CVA tenderness [Cervical Lymph Nodes Enlarged Posterior Bilaterally] : posterior cervical [Cervical Lymph Nodes Enlarged Anterior Bilaterally] : anterior cervical [Supraclavicular Lymph Nodes Enlarged Bilaterally] : supraclavicular [Axillary Lymph Nodes Enlarged Bilaterally] : axillary [Femoral Lymph Nodes Enlarged Bilaterally] : femoral [Inguinal Lymph Nodes Enlarged Bilaterally] : inguinal [No CVA Tenderness] : no ~M costovertebral angle tenderness [No Spinal Tenderness] : no spinal tenderness [Abnormal Walk] : normal gait [Skin Color & Pigmentation] : normal skin color and pigmentation [Oriented To Time, Place, And Person] : oriented to person, place, and time [Impaired Insight] : insight and judgment were intact [Affect] : the affect was normal [Mood] : the mood was normal [Scleral Icterus] : No Scleral Icterus [Spider Angioma] : No spider angioma(s) were observed [Abdominal  Ascites] : no ascites [Asterixis] : no asterixis observed [Jaundice] : No jaundice [Palmar Erythema] : no Palmar Erythema [Depression] : no depression [FreeTextEntry1] : Obese [Dilated Collat. Veins] : no collateral vein dilation [Striae] : no striae [Firm] : not firm [Rigid] : not rigid [Rebound] : no rebound [Guarding] : no guarding [Burnham's] : a negative Burnham's sign [Liver Tender To Palpation] : not tender

## 2022-11-30 NOTE — REVIEW OF SYSTEMS
[As Noted in HPI] : as noted in HPI [Diarrhea] : diarrhea [Heartburn] : heartburn [Arthralgias] : arthralgias [Negative] : Heme/Lymph [Chest Pain] : no chest pain [Palpitations] : no palpitations [Abdominal Pain] : no abdominal pain [Vomiting] : no vomiting [Constipation] : no constipation [Melena] : no melena [Dysuria] : no dysuria [Itching] : no itching [Confused] : no confusion [Depression] : no depression [FreeTextEntry2] : 9 lb loss in 1 week?  [FreeTextEntry6] : Has asthma, occasional wheezing [FreeTextEntry7] : Hematochezia.  [FreeTextEntry9] : chronic

## 2025-01-20 NOTE — ASU PATIENT PROFILE, ADULT - FALL HARM RISK - CONCLUSION
[facility]  [address]  [place] [address]  Dept: [phone]  Dept Fax: [phone]        Ambulatory Follow Up      Subjective:   Amelia Arnold is a 46 y.o. year old female who presents to our office today for routine followup regarding her   1. Lateral epicondylitis of left elbow    2. Lateral epicondylitis of right elbow    .    Chief Complaint   Patient presents with    Elbow Pain     - bilateral elbow DOI: 1/6/24     [facility] #[id number]  DOI 1/6/24  History of Present Illness  The patient is a 46-year-old female who presents for evaluation of bilateral tennis elbow, also known as bilateral lateral epicondylitis.    She was initially diagnosed with right lateral epicondylitis following an injury on 01/06/2024. The left elbow was subsequently diagnosed with lateral epicondylitis. She reports persistent symptoms in her right elbow, despite ongoing therapy. She recalls an incident around [place] where she experienced increased pain in her right elbow two days after assisting her partner with a light task. She is right-handed. Her last therapy session focused on shoulder tightness, and she has since refilled her steroid prescription. She expresses frustration over her condition, which has persisted for over a year, and the impact it has had on her work and financial situation. She is currently undergoing therapy for both elbows, with sessions scheduled until the end of 02/2025. Her left elbow, however, exhibits fluctuating levels of discomfort, with some days being more tolerable than others. She has been advised to avoid heavy lifting and typing due to her condition. She has discussed the possibility of surgery with her therapist, who suggested that the other elbow might compensate for the operated one, potentially exacerbating the pain. She is not depressed but admits to feeling frustrated. She 
Universal Safety Interventions

## (undated) DEVICE — DRSG MASTISOL

## (undated) DEVICE — WRAP COMPRESSION CALF MED

## (undated) DEVICE — DRSG BANDAID 0.75X3"

## (undated) DEVICE — SUT POLYSORB 0 30" GU-46

## (undated) DEVICE — SUT SURGIPRO 1 30" GS-21

## (undated) DEVICE — PACK GENERAL LAPAROSCOPY

## (undated) DEVICE — SYR LUER LOK 10CC

## (undated) DEVICE — SUT POLYSORB 4-0 27" P-12 UNDYED

## (undated) DEVICE — BLADE SURGICAL #15 CARBON

## (undated) DEVICE — TROCAR COVIDIEN VERSAONE BLADED 11MM STD

## (undated) DEVICE — SPONGE ENDO PEANUT 5MM

## (undated) DEVICE — BLANKET WARMER UPPER ADULT

## (undated) DEVICE — ELCTR GROUNDING PAD ADULT COVIDIEN

## (undated) DEVICE — DRAPE HALF SHEET 40X57"

## (undated) DEVICE — DRAPE LIGHT HANDLE COVER BLUE

## (undated) DEVICE — D HELP - CLEARVIEW CLEARIFY SYSTEM

## (undated) DEVICE — TUBING STRYKEFLOW II SUCTION / IRRIGATOR

## (undated) DEVICE — TROCAR COVIDIEN VERSAONE BLADED SMOOTH 5MM

## (undated) DEVICE — DRAIN JACKSON PRATT 10MM FLAT 3/4 NO TROCAR

## (undated) DEVICE — GLV 7 PROTEXIS

## (undated) DEVICE — ELCTR FOOT CONTROL L WIRE LAPAROSCOPIC

## (undated) DEVICE — DRAIN RESERVOIR FOR JACKSON PRATT 100CC CARDINAL

## (undated) DEVICE — GLV 7 ESTEEM BLUE

## (undated) DEVICE — NDL HYPO SAFE 25G X 1.5"

## (undated) DEVICE — DRSG GAUZE PETROLEUM 3X9"

## (undated) DEVICE — NDL INSUFFLATION SURGINEEDLE 120MM

## (undated) DEVICE — DRAPE C ARM UNIVERSAL

## (undated) DEVICE — FOR-ESU VALLEYLAB T7E14843DX: Type: DURABLE MEDICAL EQUIPMENT

## (undated) DEVICE — SOL IRR POUR NS 0.9% 1500ML

## (undated) DEVICE — PRECISION CUT SCISSOR MICROLINE MINI ENDOCUT DISP

## (undated) DEVICE — TUBING STRYKER PNEUMOSURE HEATED RTP